# Patient Record
Sex: FEMALE | Race: WHITE | NOT HISPANIC OR LATINO | URBAN - METROPOLITAN AREA
[De-identification: names, ages, dates, MRNs, and addresses within clinical notes are randomized per-mention and may not be internally consistent; named-entity substitution may affect disease eponyms.]

---

## 2019-06-07 ENCOUNTER — WALK IN (OUTPATIENT)
Dept: URGENT CARE | Age: 24
End: 2019-06-07

## 2019-06-07 VITALS
OXYGEN SATURATION: 100 % | BODY MASS INDEX: 29.88 KG/M2 | HEART RATE: 110 BPM | HEIGHT: 64 IN | DIASTOLIC BLOOD PRESSURE: 80 MMHG | SYSTOLIC BLOOD PRESSURE: 120 MMHG | RESPIRATION RATE: 20 BRPM | WEIGHT: 175 LBS | TEMPERATURE: 98.6 F

## 2019-06-07 DIAGNOSIS — B96.89 ACUTE BACTERIAL RHINOSINUSITIS: Primary | ICD-10-CM

## 2019-06-07 DIAGNOSIS — J01.90 ACUTE BACTERIAL RHINOSINUSITIS: Primary | ICD-10-CM

## 2019-06-07 PROCEDURE — X0943 AMG SELF PAY VISIT: HCPCS | Performed by: NURSE PRACTITIONER

## 2019-06-07 RX ORDER — AZITHROMYCIN 250 MG/1
TABLET, FILM COATED ORAL
Qty: 6 TABLET | Refills: 0 | Status: SHIPPED | OUTPATIENT
Start: 2019-06-07

## 2019-06-07 SDOH — HEALTH STABILITY: MENTAL HEALTH: HOW OFTEN DO YOU HAVE A DRINK CONTAINING ALCOHOL?: NEVER

## 2019-06-07 ASSESSMENT — ENCOUNTER SYMPTOMS
SINUS PRESSURE: 1
SINUS PAIN: 1
FEVER: 1

## 2023-03-15 ENCOUNTER — NURSE ONLY (OUTPATIENT)
Dept: INTERNAL MEDICINE CLINIC | Facility: HOSPITAL | Age: 28
End: 2023-03-15
Attending: EMERGENCY MEDICINE

## 2023-03-15 DIAGNOSIS — Z00.00 WELLNESS EXAMINATION: Primary | ICD-10-CM

## 2023-03-15 LAB
MEV IGG SER-ACNC: 163 AU/ML (ref 16.5–?)
MUV IGG SER IA-ACNC: 62.8 AU/ML (ref 11–?)
RUBV IGG SER QL: POSITIVE
RUBV IGG SER-ACNC: 38.3 IU/ML (ref 10–?)
VZV IGG SER IA-ACNC: 1228 (ref 165–?)

## 2023-03-15 PROCEDURE — 86480 TB TEST CELL IMMUN MEASURE: CPT

## 2023-03-15 PROCEDURE — 86762 RUBELLA ANTIBODY: CPT

## 2023-03-15 PROCEDURE — 86735 MUMPS ANTIBODY: CPT

## 2023-03-15 PROCEDURE — 86787 VARICELLA-ZOSTER ANTIBODY: CPT

## 2023-03-15 PROCEDURE — 86765 RUBEOLA ANTIBODY: CPT

## 2023-03-16 LAB
M TB IFN-G CD4+ T-CELLS BLD-ACNC: 0.04 IU/ML
M TB TUBERC IFN-G BLD QL: NEGATIVE
M TB TUBERC IGNF/MITOGEN IGNF CONTROL: >10 IU/ML
QFT TB1 AG MINUS NIL: 0.01 IU/ML
QFT TB2 AG MINUS NIL: 0.01 IU/ML

## 2024-03-07 ENCOUNTER — HOSPITAL ENCOUNTER (EMERGENCY)
Facility: HOSPITAL | Age: 29
Discharge: HOME | End: 2024-03-07
Attending: EMERGENCY MEDICINE
Payer: COMMERCIAL

## 2024-03-07 ENCOUNTER — APPOINTMENT (OUTPATIENT)
Dept: GENERAL RADIOLOGY | Facility: HOSPITAL | Age: 29
End: 2024-03-07
Attending: EMERGENCY MEDICINE
Payer: COMMERCIAL

## 2024-03-07 VITALS
OXYGEN SATURATION: 99 % | HEART RATE: 76 BPM | SYSTOLIC BLOOD PRESSURE: 129 MMHG | TEMPERATURE: 98 F | DIASTOLIC BLOOD PRESSURE: 81 MMHG | RESPIRATION RATE: 16 BRPM | WEIGHT: 180 LBS | HEIGHT: 64 IN | BODY MASS INDEX: 30.73 KG/M2
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
## 2024-03-07 DIAGNOSIS — M54.50 LOW BACK PAIN AT MULTIPLE SITES: ICD-10-CM

## 2024-03-07 DIAGNOSIS — R07.89 CHEST WALL PAIN: ICD-10-CM

## 2024-03-07 PROCEDURE — 72110 X-RAY EXAM L-2 SPINE 4/>VWS: CPT | Performed by: EMERGENCY MEDICINE

## 2024-03-07 PROCEDURE — 71120 X-RAY EXAM BREASTBONE 2/>VWS: CPT | Performed by: EMERGENCY MEDICINE

## 2024-03-07 PROCEDURE — 99284 EMERGENCY DEPT VISIT MOD MDM: CPT

## 2024-03-07 PROCEDURE — 71046 X-RAY EXAM CHEST 2 VIEWS: CPT | Performed by: EMERGENCY MEDICINE

## 2024-03-07 PROCEDURE — 73560 X-RAY EXAM OF KNEE 1 OR 2: CPT | Performed by: EMERGENCY MEDICINE

## 2024-03-07 RX ORDER — NAPROXEN 500 MG/1
500 TABLET ORAL 2 TIMES DAILY PRN
Qty: 20 TABLET | Refills: 0 | Status: SHIPPED | OUTPATIENT
Start: 2024-03-07

## 2024-03-07 RX ORDER — CYCLOBENZAPRINE HCL 10 MG
10 TABLET ORAL 3 TIMES DAILY PRN
Qty: 20 TABLET | Refills: 0 | Status: SHIPPED | OUTPATIENT
Start: 2024-03-07 | End: 2024-03-14
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
## 2024-03-07 NOTE — DISCHARGE INSTRUCTIONS
Use ice not heat for the first 72 hours.  Expect to feel more sore tomorrow this is common.  Stay well-hydrated.
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
## 2024-08-03 ENCOUNTER — HOSPITAL ENCOUNTER (OUTPATIENT)
Age: 29
Discharge: HOME | End: 2024-08-03
Attending: EMERGENCY MEDICINE
Payer: COMMERCIAL

## 2024-08-03 VITALS
HEART RATE: 87 BPM | HEIGHT: 64 IN | RESPIRATION RATE: 20 BRPM | OXYGEN SATURATION: 98 % | TEMPERATURE: 98 F | BODY MASS INDEX: 31.58 KG/M2 | WEIGHT: 185 LBS | DIASTOLIC BLOOD PRESSURE: 82 MMHG | SYSTOLIC BLOOD PRESSURE: 114 MMHG

## 2024-08-03 DIAGNOSIS — L03.116 CELLULITIS OF LEFT LOWER EXTREMITY: Primary | ICD-10-CM

## 2024-08-03 PROCEDURE — 99214 OFFICE O/P EST MOD 30 MIN: CPT

## 2024-08-03 PROCEDURE — 99213 OFFICE O/P EST LOW 20 MIN: CPT

## 2024-08-03 RX ORDER — CEPHALEXIN 250 MG/5ML
500 POWDER, FOR SUSPENSION ORAL 3 TIMES DAILY
Qty: 210 ML | Refills: 0 | Status: SHIPPED | OUTPATIENT
Start: 2024-08-03 | End: 2024-08-10

## 2024-08-03 NOTE — ED INITIAL ASSESSMENT (HPI)
Patient reports she thinks she was bitten by something on her left ankle Thursday.  Patient reports she scratched the area and now has redness and swelling to her left ankle.

## 2024-08-03 NOTE — ED PROVIDER NOTES
Patient Seen in: [facility]      History     Chief Complaint   Patient presents with    Cellulitis     Stated Complaint: infection in ankle    Subjective:   HPI    Patient is a 29-year-old female presents with erythema and swelling of her left ankle.  Says it started out as a bug bite.  More erythematous and tender now.  Symptoms of the last several days.  Has taken anti-inflammatories without relief.  No other specific complaints.  No fevers or chills.    Objective:   History reviewed. No pertinent past medical history.           History reviewed. No pertinent surgical history.             Social History     Socioeconomic History    Marital status: Single   Tobacco Use    Smoking status: Never    Smokeless tobacco: Never   Vaping Use    Vaping status: Never Used   Substance and Sexual Activity    Alcohol use: Not Currently    Drug use: Never              Review of Systems    Positive for stated Chief Complaint: Cellulitis    Other systems are as noted in HPI.  Constitutional and vital signs reviewed.      All other systems reviewed and negative except as noted above.    Physical Exam     ED Triage Vitals [08/03/24 1409]   /82   Pulse 87   Resp 20   Temp 98.4 °F (36.9 °C)   Temp src Temporal   SpO2 98 %   O2 Device None (Room air)       Current Vitals:   Vital Signs  BP: 114/82  Pulse: 87  Resp: 20  Temp: 98.4 °F (36.9 °C)  Temp src: Temporal    Oxygen Therapy  SpO2: 98 %  O2 Device: None (Room air)            Physical Exam    General: Well-appearing patient of stated age resting comfortably  HEENT: Normocephalic atraumatic.  Nonicteric sclera.  Moist mucous membranes  Lungs: No tachypnea  Cardiac: No tachycardia  Skin: No rashes, pallor  Neuro: No focal deficits.  Extremities: Area of erythema on the lateral left ankle.  There is a papule at the consistent with a bug bite.  No fluctuance.  Neurovascular intact distally.  Soft tissue swelling.  ED Course   Labs Reviewed - No data to  display                   MDM      Patient is a 29-year-old presents with erythema and tenderness to the left ankle.  Differential includes insect/bee sting, cellulitis, other.  Appears to be cellulitic on exam.  Will put on Keflex.  Patient request liquid.  Elevation, Tylenol ibuprofen.  Go to the ER if worsening redness, fevers, new complaints.  Follow-up with primary care.                                   Medical Decision Making      Disposition and Plan     Clinical Impression:  1. Cellulitis of left lower extremity         Disposition:  Discharge  8/3/2024  2:16 pm    Follow-up:  Allyssa Lee DO  [address] [facility] [address]  [phone]    In 1 week            Medications Prescribed:  Current Discharge Medication List        START taking these medications    Details   cephALEXin 250 MG/5ML Oral Recon Susp Take 10 mL (500 mg total) by mouth 3 (three) times daily for 7 days.  Qty: 210 mL, Refills: 0

## 2024-08-03 NOTE — DISCHARGE INSTRUCTIONS
Can wash area with soap and water followed by Neosporin or bacitracin.  Elevate leg is much as possible.  Antibiotics as prescribed.  Go to the ER if worsening symptoms or new complaints.

## 2024-11-22 ENCOUNTER — HOSPITAL ENCOUNTER (OUTPATIENT)
Dept: GENERAL RADIOLOGY | Age: 29
Discharge: HOME | End: 2024-11-22
Attending: PHYSICIAN ASSISTANT
Payer: COMMERCIAL

## 2024-11-22 ENCOUNTER — OFFICE VISIT (OUTPATIENT)
Dept: FAMILY MEDICINE CLINIC | Facility: CLINIC | Age: 29
End: 2024-11-22
Payer: COMMERCIAL

## 2024-11-22 VITALS
OXYGEN SATURATION: 98 % | HEART RATE: 108 BPM | HEIGHT: 64 IN | WEIGHT: 181 LBS | BODY MASS INDEX: 30.9 KG/M2 | DIASTOLIC BLOOD PRESSURE: 72 MMHG | SYSTOLIC BLOOD PRESSURE: 98 MMHG | RESPIRATION RATE: 14 BRPM | TEMPERATURE: 100 F

## 2024-11-22 DIAGNOSIS — J06.9 URI WITH COUGH AND CONGESTION: Primary | ICD-10-CM

## 2024-11-22 DIAGNOSIS — R05.1 ACUTE COUGH: ICD-10-CM

## 2024-11-22 LAB
OPERATOR ID: NORMAL
RAPID SARS-COV-2 BY PCR: NOT DETECTED

## 2024-11-22 PROCEDURE — 99204 OFFICE O/P NEW MOD 45 MIN: CPT | Performed by: PHYSICIAN ASSISTANT

## 2024-11-22 PROCEDURE — 71046 X-RAY EXAM CHEST 2 VIEWS: CPT | Performed by: PHYSICIAN ASSISTANT

## 2024-11-22 PROCEDURE — U0002 COVID-19 LAB TEST NON-CDC: HCPCS | Performed by: PHYSICIAN ASSISTANT

## 2024-11-22 RX ORDER — BENZONATATE 200 MG/1
200 CAPSULE ORAL 3 TIMES DAILY PRN
Qty: 30 CAPSULE | Refills: 0 | Status: SHIPPED | OUTPATIENT
Start: 2024-11-22 | End: 2024-12-02

## 2024-11-22 NOTE — PATIENT INSTRUCTIONS
Rest   Push fluids   Tylenol or ibuprofen OTC as needed for pain/fever   Zyrtec OTC once daily for nasal drainage  Cough medication as needed   Please follow up with PCP if no improvement or if symptoms worsen

## 2024-11-22 NOTE — PROGRESS NOTES
CHIEF COMPLAINT:     Chief Complaint   Patient presents with    Cough         HPI:   Kathy Sorenson is a 29 year old female who presents for cold symptoms for 3 days.  No fever. + body aches/chills. + headache. + nasal congestion. No ear pain. No sore throat. Productive cough.Chest soreness with cough. No SOB. No GI symptoms. No covid at home testing. Taking no OTC medication         Current Outpatient Medications   Medication Sig Dispense Refill    benzonatate 200 MG Oral Cap Take 1 capsule (200 mg total) by mouth 3 (three) times daily as needed for cough. 30 capsule 0    naproxen 500 MG Oral Tab Take 1 tablet (500 mg total) by mouth 2 (two) times daily as needed. (Patient not taking: Reported on 11/22/2024) 20 tablet 0      History reviewed. No pertinent past medical history.   Social History:  Social History     Socioeconomic History    Marital status: Single   Tobacco Use    Smoking status: Never    Smokeless tobacco: Never   Vaping Use    Vaping status: Never Used   Substance and Sexual Activity    Alcohol use: Not Currently    Drug use: Never        REVIEW OF SYSTEMS:   GENERAL: No fever or chills.  SKIN: No rashes, or other skin lesions.   EYES: Denies blurred vision or double vision.  HENT: See HPI  CARDIOVASCULAR: Denies chest pain or palpitations  LUNGS: Per HPI.  GI: Denies N/V/C/D or abdominal pain.      EXAM:   BP 98/72   Pulse 108   Temp 99.7 °F (37.6 °C) (Oral)   Resp 14   Ht 5' 4\" (1.626 m)   Wt 181 lb (82.1 kg)   LMP 11/04/2024 (Approximate)   SpO2 98%   BMI 31.07 kg/m²   Physical Exam  Constitutional:       Appearance: Normal appearance.   HENT:      Head: Normocephalic.      Right Ear: Tympanic membrane, ear canal and external ear normal.      Left Ear: Tympanic membrane, ear canal and external ear normal.      Nose: Rhinorrhea present.      Mouth/Throat:      Mouth: Mucous membranes are moist.      Pharynx: Oropharynx is clear. No posterior oropharyngeal erythema.      Tonsils: No  tonsillar exudate.   Eyes:      Conjunctiva/sclera: Conjunctivae normal.      Pupils: Pupils are equal, round, and reactive to light.   Cardiovascular:      Rate and Rhythm: Regular rhythm. Tachycardia present.      Heart sounds: Normal heart sounds. No murmur heard.  Pulmonary:      Effort: Pulmonary effort is normal. No respiratory distress.      Breath sounds: Normal breath sounds. No decreased breath sounds, wheezing, rhonchi or rales.      Comments: Dry cough  Chest:      Chest wall: Tenderness (over sternum) present.   Musculoskeletal:      Cervical back: Normal range of motion and neck supple.   Lymphadenopathy:      Cervical: No cervical adenopathy.   Skin:     General: Skin is warm.      Findings: No rash.   Neurological:      Mental Status: She is alert and oriented to person, place, and time.       Recent Results (from the past 24 hours)   Rapid Covid-19    Collection Time: 11/22/24 11:41 AM    Specimen: Nares   Result Value Ref Range    Rapid SARS-CoV-2 by PCR Not Detected Not Detected    POCT Lot Number [id number]     POCT Expiration Date 3/24/26     POCT Procedure Control Control Valid Control Valid     ,031           ASSESSMENT AND PLAN:     Kathy Sorenson is a 29 year old female who presents with:     ASSESSMENT:  Encounter Diagnosis   Name Primary?    URI with cough and congestion Yes     Chest xray negative     PLAN:  Meds as below.  Comfort Care as listed in Patient Instructions.      Meds & Refills for this Visit:  Requested Prescriptions     Signed Prescriptions Disp Refills    benzonatate 200 MG Oral Cap 30 capsule 0     Sig: Take 1 capsule (200 mg total) by mouth 3 (three) times daily as needed for cough.     Side effects, risks, benefits, of medication explained and discussed.    The patient indicates understanding of these issues and agrees to the plan.    Patient Instructions   Rest   Push fluids   Tylenol or ibuprofen OTC as needed for pain/fever   Zyrtec OTC once daily for nasal  drainage  Cough medication as needed   Please follow up with PCP if no improvement or if symptoms worsen

## 2025-01-31 ENCOUNTER — NURSE ONLY (OUTPATIENT)
Dept: LAB | Age: 30
End: 2025-01-31
Attending: NURSE PRACTITIONER
Payer: COMMERCIAL

## 2025-01-31 ENCOUNTER — OFFICE VISIT (OUTPATIENT)
Dept: FAMILY MEDICINE CLINIC | Facility: CLINIC | Age: 30
End: 2025-01-31
Payer: COMMERCIAL

## 2025-01-31 VITALS
WEIGHT: 180 LBS | HEIGHT: 64 IN | OXYGEN SATURATION: 98 % | RESPIRATION RATE: 16 BRPM | HEART RATE: 89 BPM | SYSTOLIC BLOOD PRESSURE: 115 MMHG | DIASTOLIC BLOOD PRESSURE: 78 MMHG | TEMPERATURE: 99 F | BODY MASS INDEX: 30.73 KG/M2

## 2025-01-31 DIAGNOSIS — J02.9 SORE THROAT: ICD-10-CM

## 2025-01-31 DIAGNOSIS — U07.1 COVID: Primary | ICD-10-CM

## 2025-01-31 DIAGNOSIS — R68.89 FLU-LIKE SYMPTOMS: ICD-10-CM

## 2025-01-31 LAB
CONTROL LINE PRESENT WITH A CLEAR BACKGROUND (YES/NO): YES YES/NO
KIT LOT #: NORMAL NUMERIC
OPERATOR ID: ABNORMAL
POCT INFLUENZA A: NEGATIVE
POCT INFLUENZA B: NEGATIVE
RAPID SARS-COV-2 BY PCR: DETECTED
STREP GRP A CUL-SCR: NEGATIVE

## 2025-01-31 PROCEDURE — 99213 OFFICE O/P EST LOW 20 MIN: CPT | Performed by: NURSE PRACTITIONER

## 2025-01-31 PROCEDURE — 87880 STREP A ASSAY W/OPTIC: CPT | Performed by: NURSE PRACTITIONER

## 2025-01-31 PROCEDURE — 87502 INFLUENZA DNA AMP PROBE: CPT

## 2025-01-31 PROCEDURE — U0002 COVID-19 LAB TEST NON-CDC: HCPCS | Performed by: NURSE PRACTITIONER

## 2025-01-31 NOTE — PATIENT INSTRUCTIONS
-Follow CDC guidelines   -Push fluids and plenty of rest    -OTC cough medicine such as Mucinex DM or Robitussin DM (guaifenesin and dextromethorphan) as packet insert for dry and congested cough.    -OTC Tylenol/Ibuprofen as packet insert If no allergies  -Soothing cough drops as packet insert  -Zyrtec or Claritin for runny nose    -Flonase OTC 2 sprays each nostril daily as packet insert.  Stop if any nose bleeds  -Good handwashing, to prevent spread of virus    -Face mask helps prevent viral infections    Follow up in 3-5 days for worsening symptoms with clinic or PCP

## 2025-01-31 NOTE — PROGRESS NOTES
CHIEF COMPLAINT:     Chief Complaint   Patient presents with    Sore Throat     Sore throat,cough,chills,headache,SX since yesterday  Denies fever  Denies OTC medication        HPI:   Kathy Sorenson is a 30 year old female who presents for upper respiratory symptoms started yesterday.  Patient reports sore throat.  Associated symptoms include cough, chills, headache.  Denies fevers.    Symptoms have increased since onset.  Treating symptoms with no OTC.     No hx of COVID; Has 1 vaccine.    She works at [employer].     No current outpatient medications on file.      History reviewed. No pertinent past medical history.   History reviewed. No pertinent surgical history.      Social History     Socioeconomic History    Marital status: Single   Tobacco Use    Smoking status: Never    Smokeless tobacco: Never   Vaping Use    Vaping status: Never Used   Substance and Sexual Activity    Alcohol use: Not Currently    Drug use: Never         REVIEW OF SYSTEMS:   GENERAL: feels well otherwise,   OK appetite  SKIN: no rashes or abnormal skin lesions  HEENT: See HPI  LUNGS: denies shortness of breath or wheezing, See HPI  CARDIOVASCULAR: denies chest pain or palpitations   GI: denies N/V/C or abdominal pain  NEURO: headaches    EXAM:   /78   Pulse 89   Temp 98.7 °F (37.1 °C) (Oral)   Resp 16   Ht 5' 4\" (1.626 m)   Wt 180 lb (81.6 kg)   LMP 01/17/2025 (Approximate)   SpO2 98%   BMI 30.90 kg/m²   GENERAL: well developed, well nourished, in no apparent distress  SKIN: no rashes,no suspicious lesions  HEAD: atraumatic, normocephalic.  No tenderness on palpation of sinuses  EYES: conjunctiva clear  EARS: TM's clear, no bulging, no retraction, no fluid, bony landmarks visualized but some cerumen.   NOSE: Nostrils patent, clear nasal discharge, nasal mucosa pink and not inflamed  THROAT: Oral mucosa pink, moist. Posterior pharynx is erythematous. No exudates. Tonsils 1/4.    NECK: Supple, non-tender  LUNGS:  clear to auscultation bilaterally; good air movement.  Breathing is non labored.  CARDIO: RRR without murmur  EXTREMITIES: no cyanosis, clubbing or edema  LYMPH:  No cervical lymphadenopathy.      Results for orders placed or performed in visit on 01/31/25   Rapid Covid-19    Collection Time: 01/31/25 11:34 AM    Specimen: Nares   Result Value Ref Range    Rapid SARS-CoV-2 by PCR Detected (A) Not Detected    POCT Lot Number [id number]     POCT Expiration Date 7/30/2026     POCT Procedure Control Control Valid Control Valid     ,123    Rapid Strep    Collection Time: 01/31/25 11:50 AM   Result Value Ref Range    Strep Grp A Screen negative Negative    Control Line Present with a clear background (yes/no) yes Yes/No    Kit Lot # [id number] Numeric    Kit Expiration Date 11/4/2025 Date         ASSESSMENT AND PLAN:   Kathy Sorenson is a 30 year old female who presents with     ASSESSMENT:   Encounter Diagnoses   Name Primary?    COVID Yes    Sore throat        PLAN:     Discussed Paxlovid and patient declined.   She will take OTC.   OTC Meds as listed in handout.  Risks, benefits, and side effects of medication explained and discussed.  Discussed viral etiology. Reviewed symptom relief measures with patient.   To f/u with PCP if sx do not resolve as anticipated.      Meds & Refills for this Visit:  Requested Prescriptions      No prescriptions requested or ordered in this encounter           Patient Instructions   -Follow CDC guidelines   -Push fluids and plenty of rest    -OTC cough medicine such as Mucinex DM or Robitussin DM (guaifenesin and dextromethorphan) as packet insert for dry and congested cough.    -OTC Tylenol/Ibuprofen as packet insert If no allergies  -Soothing cough drops as packet insert  -Zyrtec or Claritin for runny nose    -Flonase OTC 2 sprays each nostril daily as packet insert.  Stop if any nose bleeds  -Good handwashing, to prevent spread of virus    -Face mask helps prevent viral  infections    Follow up in 3-5 days for worsening symptoms with clinic or PCP       The patient indicates understanding of these issues and agrees to the plan.

## 2025-07-31 ENCOUNTER — OFFICE VISIT (OUTPATIENT)
Dept: FAMILY MEDICINE CLINIC | Facility: CLINIC | Age: 30
End: 2025-07-31
Payer: COMMERCIAL

## 2025-07-31 VITALS
BODY MASS INDEX: 33.8 KG/M2 | HEART RATE: 72 BPM | RESPIRATION RATE: 16 BRPM | DIASTOLIC BLOOD PRESSURE: 66 MMHG | HEIGHT: 64 IN | SYSTOLIC BLOOD PRESSURE: 104 MMHG | WEIGHT: 198 LBS | TEMPERATURE: 99 F | OXYGEN SATURATION: 99 %

## 2025-07-31 DIAGNOSIS — R39.9 URINARY SYMPTOM OR SIGN: Primary | ICD-10-CM

## 2025-07-31 LAB
BILIRUBIN: NEGATIVE
CONTROL LINE PRESENT WITH A CLEAR BACKGROUND (YES/NO): YES YES/NO
GLUCOSE (URINE DIPSTICK): NEGATIVE MG/DL
KETONES (URINE DIPSTICK): NEGATIVE MG/DL
KIT LOT #: NORMAL NUMERIC
MULTISTIX LOT#: ABNORMAL NUMERIC
NITRITE, URINE: NEGATIVE
PH, URINE: 6 (ref 4.5–8)
PREGNANCY TEST, URINE: NEGATIVE
SPECIFIC GRAVITY: 1.01 (ref 1–1.03)
URINE-COLOR: YELLOW
UROBILINOGEN,SEMI-QN: 0.2 MG/DL (ref 0–1.9)

## 2025-07-31 PROCEDURE — 81025 URINE PREGNANCY TEST: CPT | Performed by: NURSE PRACTITIONER

## 2025-07-31 PROCEDURE — 99213 OFFICE O/P EST LOW 20 MIN: CPT | Performed by: NURSE PRACTITIONER

## 2025-07-31 PROCEDURE — 81003 URINALYSIS AUTO W/O SCOPE: CPT | Performed by: NURSE PRACTITIONER

## 2025-07-31 PROCEDURE — 87086 URINE CULTURE/COLONY COUNT: CPT | Performed by: NURSE PRACTITIONER

## 2025-07-31 RX ORDER — NITROFURANTOIN 25; 75 MG/1; MG/1
100 CAPSULE ORAL 2 TIMES DAILY
Qty: 14 CAPSULE | Refills: 0 | Status: SHIPPED | OUTPATIENT
Start: 2025-07-31 | End: 2025-08-07

## (undated) NOTE — LETTER
Date & Time: 3/7/2024, 5:23 PM  Patient: Kathy Sorenson  Encounter Provider(s):    Pippa Rabago MD       To Whom It May Concern:    Kathy Sorenson was seen and treated in our department on 3/7/2024. She should not return to work until 03/10/2024 -May return sooner if symptoms resolve .    If you have any questions or concerns, please do not hesitate to call.        _____________________________  Physician/AP[facility] Signature